# Patient Record
Sex: MALE | Race: WHITE
[De-identification: names, ages, dates, MRNs, and addresses within clinical notes are randomized per-mention and may not be internally consistent; named-entity substitution may affect disease eponyms.]

---

## 2020-10-27 ENCOUNTER — HOSPITAL ENCOUNTER (EMERGENCY)
Dept: HOSPITAL 47 - EC | Age: 45
Discharge: HOME | End: 2020-10-27
Payer: COMMERCIAL

## 2020-10-27 VITALS
DIASTOLIC BLOOD PRESSURE: 73 MMHG | TEMPERATURE: 98.2 F | HEART RATE: 73 BPM | SYSTOLIC BLOOD PRESSURE: 141 MMHG | RESPIRATION RATE: 18 BRPM

## 2020-10-27 DIAGNOSIS — S62.635A: Primary | ICD-10-CM
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
## 2020-10-27 DIAGNOSIS — S61.215A: ICD-10-CM
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
## 2020-10-27 DIAGNOSIS — Z23: ICD-10-CM

## 2020-10-27 DIAGNOSIS — F17.200: ICD-10-CM
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
## 2020-10-27 DIAGNOSIS — I10: ICD-10-CM

## 2020-10-27 PROCEDURE — 96374 THER/PROPH/DIAG INJ IV PUSH: CPT

## 2020-10-27 PROCEDURE — 73130 X-RAY EXAM OF HAND: CPT

## 2020-10-27 PROCEDURE — 99283 EMERGENCY DEPT VISIT LOW MDM: CPT

## 2020-10-27 PROCEDURE — 90471 IMMUNIZATION ADMIN: CPT

## 2020-10-27 PROCEDURE — 90715 TDAP VACCINE 7 YRS/> IM: CPT

## 2020-10-27 NOTE — XR
EXAMINATION TYPE: XR hand complete LT

 

DATE OF EXAM: 10/27/2020

 

CLINICAL HISTORY: Laceration injury with pain.

 

TECHNIQUE:  Frontal, lateral and oblique images of the left hand are obtained.

 

COMPARISON: None.

 

FINDINGS:  There is no acute fracture/dislocation evident in the   left  hand. Mild narrowing through
out the PIP and DIP joints without significant spurring.  The overlying soft tissue appears unremarka
ble without suspicious radiodense foreign body seen.

 

IMPRESSION:  There is no acute fracture or dislocation in the left hand.

## 2020-10-27 NOTE — ED
General Adult HPI





- General


Chief complaint: Wound/Laceration


Stated complaint: [facility] L Hand Injury


Time Seen by Provider: 10/27/20 11:46


Source: EMS, RN notes reviewed


Mode of arrival: EMS


Limitations: no limitations





- History of Present Illness


Initial comments: 





45-year-old male presents to the emergency department for a chief complaint of 

left finger pain.  Patient reports that he got his fingers pinched in part of 

heavy machinery at his job.  Patient reports that it was more the finger pads of

his fingers that were pinched.  Patient states it is painful.  He denies any 

difficulty moving his fingers.  Tetanus not up-to-date.  Patient has no other 

complaints at this time including shortness of breath, chest pain, abdominal 

pain, nausea or vomiting, headache, or visual changes.





- Related Data


                                  Previous Rx's











 Medication  Instructions  Recorded


 


Cephalexin [Keflex] 500 mg PO Q6HR 7 Days #28 cap 10/27/20











                                    Allergies











Allergy/AdvReac Type Severity Reaction Status Date / Time


 


No Known Allergies Allergy   Verified 10/27/20 11:54














Review of Systems


ROS Statement: 


Those systems with pertinent positive or pertinent negative responses have been 

documented in the HPI.





ROS Other: All systems not noted in ROS Statement are negative.





Past Medical History


Past Medical History: Hypertension


History of Any Multi-Drug Resistant Organisms: None Reported


Past Surgical History: Orthopedic Surgery


Past Psychological History: No Psychological Hx Reported


Smoking Status: Current every day smoker


Past Alcohol Use History: Rare


Past Drug Use History: None Reported





General Exam





- General Exam Comments


Initial Comments: 





Left third digit.  Patient has a superficial avulsion injury to the superficial 

tissue of the finger pad of the left third digit.  No injury to the nail bed.  

No injury to the rest of the finger.  Capillary refill less than 2 seconds.


Left fourth digit: Patient has superficical avulsion injury to the finger pad of

 the left fourth digit.  However patient does have some bleeding also coming 

from under the nail left fourth digit.  Nail bed appears intact.  No deep 

lacerations noted.  Capillary refill less than 2 seconds.


Limitations: no limitations


General appearance: alert, in no apparent distress


Head exam: Present: atraumatic, normocephalic, normal inspection


Eye exam: Present: normal appearance, PERRL, EOMI.  Absent: scleral icterus, 

conjunctival injection, periorbital swelling


ENT exam: Present: normal exam, mucous membranes moist


Neck exam: Present: normal inspection.  Absent: tenderness, meningismus, 

lymphadenopathy


Respiratory exam: Present: normal lung sounds bilaterally.  Absent: respiratory 

distress, wheezes, rales, rhonchi, stridor


Cardiovascular Exam: Present: regular rate, normal rhythm, normal heart sounds. 

 Absent: systolic murmur, diastolic murmur, rubs, gallop, clicks





Course


                                   Vital Signs











  10/27/20 10/27/20





  11:47 13:38


 


Temperature 98.7 F 98.2 F


 


Pulse Rate 100 73


 


Respiratory 16 18





Rate  


 


Blood Pressure 137/83 141/73


 


O2 Sat by Pulse 98 99





Oximetry  














Medical Decision Making





- Medical Decision Making





X-ray did show a tuft fracture of the left fourth digit.  Wounds were cleaned 

with saline and Gelfoam was applied.  Fingers were wrapped in gauze and a splint

 was applied to the left fourth digit given tuft fracture.  Patient was started 

on antibiotics.  He does have a fracture and avulsion injury.  However I do not 

think these communicate.  Avulsion injury is relatively superficial.  Likely a 

closed fracture.  Patient was given pain medication.  He was updated on tetanus.

  He was given referral to orthopedics.  He will return here for any worsening 

symptoms.





Disposition


Clinical Impression: 


 Laceration, Open fracture of tuft of distal phalanx of finger





Disposition: HOME SELF-CARE


Condition: Good


Additional Instructions: 


Alternate Motrin and Tylenol for pain.  You may alternate these every 3 hours as

 needed.  Do not drive while taking Tylenol 3.  Try to keep Gelfoam dry.  Rest 

ice and elevate the hand.  Follow-up with your ortho in one to 2 days. Referral 

given. Return to the emergency room for any worsening symptoms.


Prescriptions: 


Cephalexin [Keflex] 500 mg PO Q6HR 7 Days #28 cap


Is patient prescribed a controlled substance at d/c from ED?: No


Referrals: 


Igyg Paulino MD [STAFF PHYSICIAN] - 1-2 days


Time of Disposition: 13:08